# Patient Record
Sex: FEMALE | Race: WHITE | NOT HISPANIC OR LATINO | Employment: OTHER | URBAN - NONMETROPOLITAN AREA
[De-identification: names, ages, dates, MRNs, and addresses within clinical notes are randomized per-mention and may not be internally consistent; named-entity substitution may affect disease eponyms.]

---

## 2018-10-05 ENCOUNTER — NURSE TRIAGE (OUTPATIENT)
Dept: CALL CENTER | Facility: HOSPITAL | Age: 28
End: 2018-10-05

## 2018-10-05 VITALS — WEIGHT: 6.38 LBS

## 2018-10-05 NOTE — TELEPHONE ENCOUNTER
"Caller is calling because of a diaper rash, stated buttocks is red, bleeding and the child is crying with bowel movements, child is breast feed. Will be going to see his provider, stated she can walk in    Reason for Disposition  • [1]  (< 1 month old ) AND [2] infection suspected (open sores, yellow crusts)    Additional Information  • Negative: [1] Red tender ring around the anus AND [2] no associated diaper rash  • Negative: Boil suspected (painful red lump that's marble size or larger)  • Negative: Doesn't fit the description of diaper rash  • Negative: [1] Age < 12 weeks AND [2] fever 100.4 F (38.0 C) or higher rectally  • Negative: [1] Reed (< 1 month old) AND [2] starts to look or act abnormal in any way (e.g., decrease in activity or feeding)  • Negative: [1]  (< 1 month old) AND [2] tiny water blisters or pimples (like chickenpox) in a cluster    Answer Assessment - Initial Assessment Questions  1. APPEARANCE OF RASH: \"What does it look like?\"       Res and warn to touch according to parent  2. SIZE: \"How much of the diaper area is involved?\"       Has bottom  3. SEVERITY: \"How bad is the diaper rash?\" \"Does it make your child cry?\"       Severe and child cries  4. ONSET: \"When did the diaper rash start?\"       Notice today  5. TRIGGERS: \"How do you clean off the skin after poops?\"       Used diaper wipes  6. RECURRENT SYMPTOM: \"Has your child had diaper rash before?\" If so, ask: \"What happened last time?\"       no  7. TREATMENT: \"What treatment worked best last time?\"       none8. CAUSE: \"What do you think is causing the diaper rash?\" mother says he is breast feed    Protocols used: DIAPER RASH-PEDIATRIC-      "

## 2020-05-28 ENCOUNTER — LAB REQUISITION (OUTPATIENT)
Dept: LAB | Facility: HOSPITAL | Age: 30
End: 2020-05-28

## 2020-05-28 DIAGNOSIS — Z00.00 ROUTINE GENERAL MEDICAL EXAMINATION AT A HEALTH CARE FACILITY: ICD-10-CM

## 2020-05-28 LAB
BLOODY SPECIMEN?: NO
FIBRONECTIN FETAL VAG QL: NEGATIVE

## 2020-05-28 PROCEDURE — 82731 ASSAY OF FETAL FIBRONECTIN: CPT | Performed by: OBSTETRICS & GYNECOLOGY

## 2021-03-29 ENCOUNTER — TELEPHONE (OUTPATIENT)
Dept: OBSTETRICS AND GYNECOLOGY | Facility: CLINIC | Age: 31
End: 2021-03-29

## 2021-03-29 NOTE — TELEPHONE ENCOUNTER
Purnima Wilkes N.P> called wanting to sched appt with gyn provider here for her daughter Erica who is currently having a lot of pelvic pain. She went to [facility] today & they gave her 800mg Ibuprofen & went her home. 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 Pt was seeing Dr Gilbert for several years. Had Left fallopian tube removed. Still has Left ovary & horn. Then had 2 children. After that they removed her Right tube. Pt still has Right ovary & horn. 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 Transferred to [facility] to sched U/S & appt. Pt's mom notified of appt date & time, 4/1 2:30 U/S 3p appt with Dr Bradford.

## 2022-06-17 ENCOUNTER — TELEPHONE (OUTPATIENT)
Dept: OTOLARYNGOLOGY | Facility: CLINIC | Age: 32
End: 2022-06-17

## 2022-11-07 ENCOUNTER — OFFICE VISIT (OUTPATIENT)
Dept: OTOLARYNGOLOGY | Facility: CLINIC | Age: 32
End: 2022-11-07

## 2022-11-07 ENCOUNTER — LAB (OUTPATIENT)
Dept: LAB | Facility: HOSPITAL | Age: 32
End: 2022-11-07

## 2022-11-07 VITALS
WEIGHT: 175 LBS | BODY MASS INDEX: 23.7 KG/M2 | HEART RATE: 74 BPM | SYSTOLIC BLOOD PRESSURE: 103 MMHG | HEIGHT: 72 IN | DIASTOLIC BLOOD PRESSURE: 65 MMHG | TEMPERATURE: 97.8 F | RESPIRATION RATE: 16 BRPM

## 2022-11-07 DIAGNOSIS — R13.10 DYSPHAGIA, UNSPECIFIED TYPE: ICD-10-CM

## 2022-11-07 DIAGNOSIS — E04.1 THYROID NODULE: ICD-10-CM

## 2022-11-07 DIAGNOSIS — E04.1 THYROID NODULE: Primary | ICD-10-CM

## 2022-11-07 DIAGNOSIS — R09.89 GLOBUS SENSATION: ICD-10-CM

## 2022-11-07 LAB
T4 FREE SERPL-MCNC: 1.12 NG/DL (ref 0.93–1.7)
TSH SERPL DL<=0.05 MIU/L-ACNC: 0.98 UIU/ML (ref 0.27–4.2)

## 2022-11-07 PROCEDURE — 84443 ASSAY THYROID STIM HORMONE: CPT

## 2022-11-07 PROCEDURE — 36415 COLL VENOUS BLD VENIPUNCTURE: CPT

## 2022-11-07 PROCEDURE — 99204 OFFICE O/P NEW MOD 45 MIN: CPT | Performed by: NURSE PRACTITIONER

## 2022-11-07 PROCEDURE — 84439 ASSAY OF FREE THYROXINE: CPT

## 2022-11-07 PROCEDURE — 84432 ASSAY OF THYROGLOBULIN: CPT

## 2022-11-07 PROCEDURE — 86800 THYROGLOBULIN ANTIBODY: CPT

## 2022-11-07 RX ORDER — FAMOTIDINE 40 MG/1
40 TABLET, FILM COATED ORAL DAILY
Qty: 30 TABLET | Refills: 3 | Status: SHIPPED | OUTPATIENT
Start: 2022-11-07 | End: 2022-11-07

## 2022-11-07 RX ORDER — FAMOTIDINE 20 MG/1
20 TABLET, FILM COATED ORAL DAILY
Qty: 30 TABLET | Refills: 3 | Status: SHIPPED | OUTPATIENT
Start: 2022-11-07

## 2022-11-07 NOTE — PROGRESS NOTES
YOB: 1990  Location: [facility]  Location Address: [address] [place] [address], [facility], [address] [place] [address]  Location Phone: [phone]    Chief Complaint   Patient presents with   • thyroid nodule       History of Present Illness  Erica Jiménez is a 31 y.o. female.  Erica Jiménez is here for evaluation of ENT complaints. The patient has had problems with thyroid nodules. The patient has had mild to moderate symptoms. The symptoms have been present for the last several months. There have been no identified factors that aggravate the symptoms. There have been no factors that have improved the symptoms.    She reports globus sensation and dysphagia. She declines hoarseness.     History reviewed. No pertinent past medical history.    Past Surgical History:   Procedure Laterality Date   •  SECTION         Outpatient Medications Marked as Taking for the 22 encounter (Office Visit) with Carlton Finn APRN   Medication Sig Dispense Refill   • Prenatal Vit-Fe Fumarate-FA (PRENATAL VITAMINS PO) Take  by mouth.         Patient has no known allergies.    History reviewed. No pertinent family history.    Social History     Socioeconomic History   • Marital status:    Tobacco Use   • Smoking status: Never   • Smokeless tobacco: Never   Vaping Use   • Vaping Use: Never used   Substance and Sexual Activity   • Alcohol use: Never   • Drug use: Never       Review of Systems   Constitutional: Negative.    HENT: Positive for trouble swallowing.         Admits globus sensation   Eyes: Negative.    Respiratory: Negative.    Cardiovascular: Negative.    Gastrointestinal: Negative.    Endocrine: Negative.    Genitourinary: Negative.    Musculoskeletal: Negative.    Skin: Negative.    Allergic/Immunologic: Negative.    Neurological: Negative.    Hematological: Negative.    Psychiatric/Behavioral: Negative.        Vitals:    22 1515   BP: 103/65   Pulse: 74   Resp: 16   Temp: 97.8 °F (36.6 °C)        Body mass index is 23.73 kg/m².    Objective     Physical Exam  Vitals reviewed.   Constitutional:       Appearance: Normal appearance. She is normal weight.   HENT:      Head: Normocephalic and atraumatic.      Right Ear: Hearing and external ear normal.      Left Ear: Hearing and external ear normal.      Nose: Nose normal.      Mouth/Throat:      Lips: Pink.      Mouth: Mucous membranes are moist.      Pharynx: Oropharynx is clear. Uvula midline.   Neck:      Comments: Palpable right thyroid nodule  Musculoskeletal:      Cervical back: Full passive range of motion without pain.   Neurological:      Mental Status: She is alert.   Psychiatric:         Behavior: Behavior is cooperative.         Assessment & Plan   Diagnoses and all orders for this visit:    1. Thyroid nodule (Primary)  -     US Thyroid  -     TSH; Future  -     T4, Free; Future  -     Thyroglobulin With Anti-TG; Future  -     US Thyroid; Future    2. Dysphagia, unspecified type    3. Globus sensation    Other orders  -     Discontinue: famotidine (Pepcid) 40 MG tablet; Take 1 tablet by mouth Daily.  Dispense: 30 tablet; Refill: 3  -     famotidine (Pepcid) 20 MG tablet; Take 1 tablet by mouth Daily.  Dispense: 30 tablet; Refill: 3      * Surgery not found *  Orders Placed This Encounter   Procedures   • US Thyroid     Order Specific Question:   Reason for Exam:     Answer:   Thyroid disease   • US Thyroid     Standing Status:   Future     Standing Expiration Date:   11/7/2023     Order Specific Question:   Reason for Exam:     Answer:   Thyroid disease   • TSH     Standing Status:   Future     Number of Occurrences:   1     Standing Expiration Date:   11/7/2023     Order Specific Question:   Release to patient     Answer:   Routine Release   • T4, Free     Standing Status:   Future     Number of Occurrences:   1     Standing Expiration Date:   11/7/2023     Order Specific Question:   Release to patient     Answer:   Routine Release   •  Thyroglobulin With Anti-TG     Standing Status:   Future     Number of Occurrences:   1     Standing Expiration Date:   11/7/2023     Order Specific Question:   Release to patient     Answer:   Routine Release     Will obtain TSH, T4, and thyroglobulin  Start 20 mg pepcid before breakfast  Call with any new/worsening problems or concerns    Return in about 2 months (around 1/7/2023) for Recheck.       Patient Instructions   Will obtain TSH, T4, and thyroglobulin  Start 20 mg pepcid before breakfast  Call with any new/worsening problems or concerns    Gastroesophageal Reflux Disease (Laryngopharyngeal Reflux), Adult  Gastroesophageal reflux disease (GERD) and/or Laryngopharyngeal Reflux, (LPR) happens when acid from your stomach flows up into the esophagus and/or throat and voicebox or larynx. When acid comes in contact with the these organs, the acid can cause soreness (inflammation). Over time, GERD may create small holes (ulcers) in the lining of the esophagus and may lead to the development of hoarseness, difficulty swallowing,   feeling of something stuck in the throat, increased mucous or drainage and even predispose to the development of malignancies, (cancer).    CAUSES   · Increased body weight. This puts pressure on the stomach, making acid rise from the stomach into the esophagus.  · Smoking. This increases acid production in the stomach.  · Drinking alcohol. This causes decreased pressure in the lower esophageal sphincter (valve or ring of muscle between the esophagus and stomach), allowing acid from the stomach into the esophagus.  · Late evening meals and a full stomach. This increases pressure and acid production in the stomach.  · A malformed lower esophageal sphincter  · Diet which can include avoidance of gluten and dairy products  · Age  SYMPTOMS   · Burning pain in the lower part of the mid-chest behind the breastbone and in the mid-stomach area. This may occur twice a week or more  often.  · Trouble swallowing.  · Sore throat.  · Dry cough.  · Asthma-like symptoms including chest tightness, shortness of breath, or wheezing.  · Globus sensation-something stuck in the throat/fullness  · Hoarseness  DIAGNOSIS   Your caregiver may be able to diagnose GERD based on your symptoms. In some cases, X-rays and other tests may be done to check for complications or to check the condition of your stomach and esophagus.  You may need to see another doctor.  TREATMENT   Over-the-counter or prescription medicines to help decrease acid production.   Dietary and behavioral modifications or changes may be also recommended.  HOME CARE INSTRUCTIONS   · Change the factors that you can control. Ask your caregiver for guidance concerning weight loss, quitting smoking, and alcohol consumption.  · Avoid foods and drinks that make your symptoms worse, and MAY include such as:  ¨ Caffeine or alcoholic drinks.  ¨ Chocolate.  ¨ Gluten containing foods  ¨ Dairy  ¨ Peppermint or mint flavorings.  ¨ Garlic and onions.  ¨ Spicy foods.  ¨ Citrus fruits, such as oranges, jacy, or limes.  ¨ Tomato-based foods such as sauce, chili, salsa, and pizza.  ¨ Fried and fatty foods.  · Avoid lying down for the 3 hours prior to your bedtime or prior to taking a nap.  · Eat small, frequent meals instead of large meals.  · Wear loose-fitting clothing. Do not wear anything tight around your waist that causes pressure on your stomach.  · Raise the head of your bed 6 to 8 inches with wood blocks to help you sleep. Extra pillows will not help.  · Only take over-the-counter or prescription medicines for pain, discomfort, or fever as directed by your caregiver.  · Do not take aspirin, ibuprofen, or other nonsteroidal anti-inflammatory drugs if possible (NSAIDs).  SEEK IMMEDIATE MEDICAL CARE IF:   · You have pain in your arms, neck, jaw, teeth, or back.  · Your pain increases or changes in intensity or duration.  · You develop nausea, vomiting,  or sweating (diaphoresis).  · You develop shortness of breath, or you faint.  · Your vomit is green, yellow, black, or looks like coffee grounds or blood.  · Your stool is red, bloody, or black.  These symptoms could be signs of other problems, such as heart disease, gastric bleeding, or esophageal bleeding.  MAKE SURE YOU:   · Understand these instructions.  · Will watch your condition.  · Will get help right away if you are not doing well or get worse.     This information is not intended to replace advice given to you by your physician. Make sure you discuss any questions you have with your health care provider.     Modified by Tim Ashraf MD, FACS 2016.  Document Released: 2006 Document Revised: 2016 Document Reviewed: 2016  [employer] Interactive Patient Education © Elsevier Inc.       CONTACT INFORMATION:  The main office phone number is [phone]. For emergencies after hours and on weekends, this number will convert over to our answering service and the on call provider will answer. Please try to keep non emergent phone calls/ questions to office hours 9am-5pm Monday through Friday.      CrowdTangle  As an alternative, you can sign up and use the Epic MyChart system for more direct and quicker access for non emergent questions/ problems.  [employer] allows you to send messages to your doctor, view your test results, renew your prescriptions, schedule appointments, and more. To sign up, go to [employer] and click on the Sign Up Now link in the New User? box. Enter your CrowdTangle Activation Code exactly as it appears below along with the last four digits of your Social Security Number and your Date of Birth () to complete the sign-up process. If you do not sign up before the expiration date, you must request a new code.     CrowdTangle Activation Code: Activation code not generated  Current CrowdTangle Status: Active     If you have questions, you can email  [email or web address] or call [phone] to talk to our MyChart staff. Remember, MyChart is NOT to be used for urgent needs. For medical emergencies, dial 911.     IF YOU SMOKE OR USE TOBACCO PLEASE READ THE FOLLOWING:  Why is smoking bad for me?  Smoking increases the risk of heart disease, lung disease, vascular disease, stroke, and cancer. If you smoke, STOP!        IF YOU SMOKE OR USE TOBACCO PLEASE READ THE FOLLOWING:  Why is smoking bad for me?  Smoking increases the risk of heart disease, lung disease, vascular disease, stroke, and cancer. If you smoke, STOP!     For more information:  Quit Now [place]  1-800-QUIT-NOW  [email or web address]

## 2022-11-07 NOTE — PATIENT INSTRUCTIONS
Will obtain TSH, T4, and thyroglobulin  Start 20 mg pepcid before breakfast  Call with any new/worsening problems or concerns    Gastroesophageal Reflux Disease (Laryngopharyngeal Reflux), Adult  Gastroesophageal reflux disease (GERD) and/or Laryngopharyngeal Reflux, (LPR) happens when acid from your stomach flows up into the esophagus and/or throat and voicebox or larynx. When acid comes in contact with the these organs, the acid can cause soreness (inflammation). Over time, GERD may create small holes (ulcers) in the lining of the esophagus and may lead to the development of hoarseness, difficulty swallowing,   feeling of something stuck in the throat, increased mucous or drainage and even predispose to the development of malignancies, (cancer).    CAUSES   Increased body weight. This puts pressure on the stomach, making acid rise from the stomach into the esophagus.  Smoking. This increases acid production in the stomach.  Drinking alcohol. This causes decreased pressure in the lower esophageal sphincter (valve or ring of muscle between the esophagus and stomach), allowing acid from the stomach into the esophagus.  Late evening meals and a full stomach. This increases pressure and acid production in the stomach.  A malformed lower esophageal sphincter  Diet which can include avoidance of gluten and dairy products  Age  SYMPTOMS   Burning pain in the lower part of the mid-chest behind the breastbone and in the mid-stomach area. This may occur twice a week or more often.  Trouble swallowing.  Sore throat.  Dry cough.  Asthma-like symptoms including chest tightness, shortness of breath, or wheezing.  Globus sensation-something stuck in the throat/fullness  Hoarseness  DIAGNOSIS   Your caregiver may be able to diagnose GERD based on your symptoms. In some cases, X-rays and other tests may be done to check for complications or to check the condition of your stomach and esophagus.  You may need to see another  doctor.  TREATMENT   Over-the-counter or prescription medicines to help decrease acid production.   Dietary and behavioral modifications or changes may be also recommended.  HOME CARE INSTRUCTIONS   Change the factors that you can control. Ask your caregiver for guidance concerning weight loss, quitting smoking, and alcohol consumption.  Avoid foods and drinks that make your symptoms worse, and MAY include such as:  Caffeine or alcoholic drinks.  Chocolate.  Gluten containing foods  Dairy  Peppermint or mint flavorings.  Garlic and onions.  Spicy foods.  Citrus fruits, such as oranges, jacy, or limes.  Tomato-based foods such as sauce, chili, salsa, and pizza.  Fried and fatty foods.  Avoid lying down for the 3 hours prior to your bedtime or prior to taking a nap.  Eat small, frequent meals instead of large meals.  Wear loose-fitting clothing. Do not wear anything tight around your waist that causes pressure on your stomach.  Raise the head of your bed 6 to 8 inches with wood blocks to help you sleep. Extra pillows will not help.  Only take over-the-counter or prescription medicines for pain, discomfort, or fever as directed by your caregiver.  Do not take aspirin, ibuprofen, or other nonsteroidal anti-inflammatory drugs if possible (NSAIDs).  SEEK IMMEDIATE MEDICAL CARE IF:   You have pain in your arms, neck, jaw, teeth, or back.  Your pain increases or changes in intensity or duration.  You develop nausea, vomiting, or sweating (diaphoresis).  You develop shortness of breath, or you faint.  Your vomit is green, yellow, black, or looks like coffee grounds or blood.  Your stool is red, bloody, or black.  These symptoms could be signs of other problems, such as heart disease, gastric bleeding, or esophageal bleeding.  MAKE SURE YOU:   Understand these instructions.  Will watch your condition.  Will get help right away if you are not doing well or get worse.     This information is not intended to replace advice  given to you by your physician. Make sure you discuss any questions you have with your health care provider.     Modified by Tim Ashraf MD, FACS 2016.  Document Released: 2006 Document Revised: 2016 Document Reviewed: 2016  [employer] Interactive Patient Education  [employer]vier Inc.       CONTACT INFORMATION:  The main office phone number is [phone]. For emergencies after hours and on weekends, this number will convert over to our answering service and the on call provider will answer. Please try to keep non emergent phone calls/ questions to office hours 9am-5pm Monday through Friday.      Trover  As an alternative, you can sign up and use the Epic MyChart system for more direct and quicker access for non emergent questions/ problems.  [employer] allows you to send messages to your doctor, view your test results, renew your prescriptions, schedule appointments, and more. To sign up, go to [employer] and click on the Sign Up Now link in the New User? box. Enter your [employer] Activation Code exactly as it appears below along with the last four digits of your Social Security Number and your Date of Birth () to complete the sign-up process. If you do not sign up before the expiration date, you must request a new code.     T[employer]ver Activation Code: Activation code not generated  Current Trover Status: Active     If you have questions, you can email [email or web address] or call [phone] to talk to our Trover staff. Remember, Trover is NOT to be used for urgent needs. For medical emergencies, dial 911.     IF YOU SMOKE OR USE TOBACCO PLEASE READ THE FOLLOWING:  Why is smoking bad for me?  Smoking increases the risk of heart disease, lung disease, vascular disease, stroke, and cancer. If you smoke, STOP!        IF YOU SMOKE OR USE TOBACCO PLEASE READ THE FOLLOWING:  Why is smoking bad for me?  Smoking increases the risk of heart disease, lung  disease, vascular disease, stroke, and cancer. If you smoke, STOP!     For more information:  Quit Now [place]  1-800-QUIT-NOW  [email or web address]

## 2022-11-08 ENCOUNTER — TELEPHONE (OUTPATIENT)
Dept: OTOLARYNGOLOGY | Facility: CLINIC | Age: 32
End: 2022-11-08

## 2022-11-08 DIAGNOSIS — E04.1 THYROID NODULE: Primary | ICD-10-CM

## 2022-11-08 NOTE — TELEPHONE ENCOUNTER
----- Message from NGOC Toro sent at 11/8/2022  7:55 AM CST -----  Please let patient know that her thyroid nodule does meet criteria for a biopsy. She will be contacted to set this up.

## 2022-11-09 LAB
THYROGLOB AB SERPL-ACNC: <1 IU/ML (ref 0–0.9)
THYROGLOB SERPL-MCNC: 9.3 NG/ML (ref 1.5–38.5)

## 2022-11-16 ENCOUNTER — HOSPITAL ENCOUNTER (OUTPATIENT)
Dept: ULTRASOUND IMAGING | Facility: HOSPITAL | Age: 32
Discharge: HOME | End: 2022-11-16
Admitting: NURSE PRACTITIONER

## 2022-11-16 DIAGNOSIS — E04.1 THYROID NODULE: ICD-10-CM

## 2022-11-16 PROCEDURE — 76942 ECHO GUIDE FOR BIOPSY: CPT

## 2022-11-16 PROCEDURE — 88112 CYTOPATH CELL ENHANCE TECH: CPT | Performed by: NURSE PRACTITIONER

## 2022-11-16 PROCEDURE — 88172 CYTP DX EVAL FNA 1ST EA SITE: CPT | Performed by: NURSE PRACTITIONER

## 2022-11-16 RX ORDER — LIDOCAINE HYDROCHLORIDE 10 MG/ML
10 INJECTION, SOLUTION INFILTRATION; PERINEURAL ONCE
Status: DISPENSED | OUTPATIENT
Start: 2022-11-16

## 2022-11-16 RX ORDER — LIDOCAINE HYDROCHLORIDE 10 MG/ML
5 INJECTION, SOLUTION INFILTRATION; PERINEURAL ONCE
Status: DISPENSED | OUTPATIENT
Start: 2022-11-16

## 2022-12-02 ENCOUNTER — TELEPHONE (OUTPATIENT)
Dept: OTOLARYNGOLOGY | Facility: CLINIC | Age: 32
End: 2022-12-02

## 2022-12-02 LAB
BEAKER LAB AP INTRAOPERATIVE CONSULTATION: NORMAL
CYTO UR: NORMAL
LAB AP CASE REPORT: NORMAL
Lab: NORMAL
PATH REPORT.FINAL DX SPEC: NORMAL
PATH REPORT.GROSS SPEC: NORMAL

## 2022-12-02 NOTE — TELEPHONE ENCOUNTER
----- Message from NGOC Toro sent at 12/2/2022  9:11 AM CST -----  FNA results, we will need to see her to discuss options

## 2023-01-09 DIAGNOSIS — C73 THYROID CARCINOMA (HCC): Primary | ICD-10-CM

## 2023-01-09 NOTE — PROGRESS NOTES
MEDICAL ONCOLOGY CONSULTATION    Pt Name: Lanette Ram  MRN: [id number]  YOB: 1990  Date of evaluation: 1/10/2023    REASON FOR CONSULTATION:  Papillary thyroid cancer  REQUESTING PHYSICIAN: Dr Alberto Dorado    History Obtained From:  patient and old medical records    HISTORY OF PRESENT ILLNESS:    Diagnosis  Papillary thyroid carcinoma, Dec 2022  BRAF V600E: POSITIVE  [id number]    Treatment Summary  12/14/22 Total thyroidectomy by Dr Nay Alejandra at [address] was first seen by me on 1/10/2023. She was referred by ENT for a diagnosis of papillary thyroid carcinoma. 1/7/22 US thyroid [facility]): Mixed cystic and solid lesion involving the midpole of the right lobe measuring up to 1.5 cm in size. It does demonstrate lobular contours with some margin somewhat ill-defined, particularly along the anterior margin of the lesion. This is borderline in size for fine-needle aspiration based on ACR criteria and is a TR 4 lesion. Given that it is a solitary lesion I would suggest FNA of the solid component of this lesion to assure benignity. 11/7/22 [facility] labs: Thyroglobulin 9.3, Thyroglobulin ab <1.0, T4 1.12, TSH 0.978  11/16/22 Thyroid, right lobe, fine-needle aspiration([facility]): [place] category III: Follicular lesion of undetermined significance  12/14/22 Total thyroidectomy by Dr Nay Alejandra at [facility] with sampling of 1 lymph node. 12/14/22 Thyroid, total thyroidectomy: Papillary thyroid carcinoma within the isthmus and lower right lobe. All margins are clear. Four parathyroid glands identified. Benign lymph  node (0/1). Tumor focality: unifocal. Tumor site: right lobe and isthmus. Tumor size: 1cm x 1cm x 0.8cm. Histologic type: papillary carcinoma, classic. Mitotic rate: 0 mitoses/2mm2. Tumor necrosis: not identified. Angioinvasion: not identified. Lymphatic invasion: not identified.  Perineural invasion: not identified. Extrathyroidal extension: not identified. Margins: All margins negative for carcinoma (1mm). Regional lymph node status: all regional lymph node negative. Number of lymph nodes examined: 1. Pathologic stage (pTNM, AJCC 8th edition): pT1a, pN0a. BRAF V600E: POSITIVE  22 [facility]: TSH 1.49  1/10/2022-she was first seen by me. She has been referred to [facility]. She is going to be seen by endocrinology. 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 Past Surgical History:    Past Surgical History:   Procedure Laterality Date     SECTION      X 2    THYROIDECTOMY  12/15/2022       Social History:    Marital status:   Smoking status:Currently; 2 cigarettes daily; 6 years  ETOH status:No  Resides: [place]    Family History:   Family History   Problem Relation Age of Onset    Cancer Mother         Ovarian    Breast Cancer Mother        Current Hospital Medications:    Current Outpatient Medications   Medication Sig Dispense Refill    levothyroxine (SYNTHROID) 100 MCG tablet TAKE 1 TABLET BY MOUTH EVERY MORNING BEFORE BREAKFAST      Calcium Carb-Cholecalciferol (OYSTER SHELL CALCIUM W/D) 500-5 MG-MCG TABS tablet TAKE 1 TABLET BY MOUTH 3 TIMES A DAY FOR 2WEEKS       No current facility-administered medications for this visit.        Allergies: No Known Allergies      Subjective   REVIEW OF SYSTEMS:   CONSTITUTIONAL: no fever, no night sweats, no fatigue;  HEENT: no blurring of vision, no double vision, no hearing difficulty, no tinnitus, no ulceration, no dysplasia, no epistaxis;  LUNGS: no cough, no hemoptysis, no wheeze,  no shortness of breath;  CARDIOVASCULAR: no palpitation, no chest pain, no shortness of breath;  GI: no abdominal pain, no nausea, no vomiting, no diarrhea, no constipation;  FORREST: no dysuria, no hematuria, no frequency or urgency, no nephrolithiasis;  MUSCULOSKELETAL: no joint pain, no swelling, no stiffness;  ENDOCRINE: no polyuria, no polydipsia, no cold or heat intolerance;  HEMATOLOGY: no easy bruising or bleeding, no history of clotting disorder;  DERMATOLOGY: no skin rash, no eczema, no pruritus;  PSYCHIATRY: no depression, no anxiety, no panic attacks, no suicidal ideation, no homicidal ideation;  NEUROLOGY: no syncope, no seizures, no numbness or tingling of hands, no numbness or tingling of feet, no paresis;    Objective   /78   Pulse 97   Temp 98.8 °F (37.1 °C)   Ht 6' (1.829 m)   Wt 176 lb 9.6 oz (80.1 kg)   SpO2 98%   BMI 23.95 kg/m²     PHYSICAL EXAM:  CONSTITUTIONAL: Alert, appropriate, no acute distress  EYES: Non icteric, EOM intact, pupils equal round   ENT: Mucus membranes moist, no oral pharyngeal lesions, external inspection of ears and nose are normal  NECK: Supple, no masses. No palpable thyroid mass  CHEST/LUNGS: CTA bilaterally, normal respiratory effort   CARDIOVASCULAR: RRR, no murmurs. No lower extremity edema  ABDOMEN: soft non-tender, active bowel sounds, no HSM. No palpable masses  EXTREMITIES: warm, full ROM in all 4 extremities, no focal weakness. SKIN: warm, dry with no rashes or lesions  LYMPH: No cervical, clavicular, axillary, or inguinal lymphadenopathy  NEUROLOGIC: follows commands, non focal   PSYCH: mood and affect appropriate. Alert and oriented to time, place, person      LABORATORY RESULTS REVIEWED/ANALYZED BY ME:  1/10/23 CBC  WBC 6.13  HGB 13    Neut 3.53    RADIOLOGY STUDIES REVIEWED BY ME:  As above      ASSESSMENT:    No orders of the defined types were placed in this encounter. John Camejo was seen today for new patient. Diagnoses and all orders for this visit:    Thyroid carcinoma [facility])    Care plan discussed with patient     Papillary thyroid carcinoma, Dec 2022 [id number]  -BRAF V600E: POSITIVE  -12/14/22 Total thyroidectomy by Dr Hi Bach at [facility] with sampling of 1 lymph node. Tumor was 1 cm. Negative LVI. Negative high risk features. Discussed NCCN guidelines briefly. The patient will be seen by endocrinology at [facility]. I will defer further recommendations to [facility]. She has been started on levothyroxine 100 mcg daily. Last TSH was 1.49 at [facility]. Again, this will be followed by endocrinology at [facility]. She will continue to be followed by [facility] at this point and we will defer further recommendations to [facility]. The patient has a family history of thyroid cancer. She will also ask if she is a candidate for a genetic test.  Of note, her tumor had a BRAF V600 E mutation. This is likely a somatic mutation. PLAN:  RTC with MD PRN if symptoms worsens  Proceed with Endocrinologist at [facility] on 1/27/23 for further treatment recommendations. He has      Paresh ARMSTRONG am pre-charting as a registered nurse for Ismael Rust MD. Electronically signed by Paresh Akins RN on 1/10/2023 at 2:46 PM CST. Servando Celis am scribing as Medical Assistant for Ismael Rust MD. Electronically signed by Nasir Gupta MA on 1/10/2023 at 2:16 PM CST. I, Dr Kb Bedolla, personally performed the services described in this documentation as scribed by Nasir Gupta MA in my presence and is both accurate and complete. I have seen, examined and reviewed this patient medication list, appropriate labs and imaging studies. I reviewed relevant medical records and others physicians notes. I discussed the plans of care with the patient. I answered all the questions to the patients satisfaction. I have also reviewed the chief complaint (CC) and part of the history (History of Present Illness (HPI), Past Family Social History [facility]), or Review of Systems (ROS) and made changes when appropriated.        (Please note that portions of this note were completed with a voice recognition program. Efforts were made to edit the dictations but occasionally words are mis-transcribed. )Electronically signed by Tan Lane MD on 1/10/2023 at 2:16 PM      The total time,  I spent to see the patient today includes at least one or more of the following: preparing to see the patient by reviewing prior tests, prior notes or other relevant information, performing appropriate independent examination and evaluation, counseling,, documenting clinic information in the electronic medical record or other health records, independently interpreting results of tests, managing test results and communicating the results to the patient/family or caregiver.

## 2023-01-10 ENCOUNTER — HOSPITAL ENCOUNTER (OUTPATIENT)
Dept: INFUSION THERAPY | Age: 33
Discharge: HOME | End: 2023-01-10
Payer: COMMERCIAL

## 2023-01-10 ENCOUNTER — OFFICE VISIT (OUTPATIENT)
Dept: HEMATOLOGY | Age: 33
End: 2023-01-10
Payer: COMMERCIAL

## 2023-01-10 VITALS
OXYGEN SATURATION: 98 % | TEMPERATURE: 98.8 F | DIASTOLIC BLOOD PRESSURE: 78 MMHG | BODY MASS INDEX: 23.92 KG/M2 | HEART RATE: 97 BPM | WEIGHT: 176.6 LBS | HEIGHT: 72 IN | SYSTOLIC BLOOD PRESSURE: 120 MMHG

## 2023-01-10 DIAGNOSIS — Z71.89 CARE PLAN DISCUSSED WITH PATIENT: ICD-10-CM

## 2023-01-10 DIAGNOSIS — C73 THYROID CARCINOMA (HCC): Primary | ICD-10-CM

## 2023-01-10 DIAGNOSIS — C73 THYROID CARCINOMA (HCC): ICD-10-CM

## 2023-01-10 LAB
BASOPHILS ABSOLUTE: 0.04 K/UL (ref 0.01–0.08)
BASOPHILS RELATIVE PERCENT: 0.7 % (ref 0.1–1.2)
EOSINOPHILS ABSOLUTE: 0.27 K/UL (ref 0.04–0.54)
EOSINOPHILS RELATIVE PERCENT: 4.4 % (ref 0.7–7)
HCT VFR BLD CALC: 40 % (ref 34.1–44.9)
HEMOGLOBIN: 13 G/DL (ref 11.2–15.7)
LYMPHOCYTES ABSOLUTE: 1.79 K/UL (ref 1.18–3.74)
LYMPHOCYTES RELATIVE PERCENT: 29.2 % (ref 19.3–53.1)
MCH RBC QN AUTO: 30.4 PG (ref 25.6–32.2)
MCHC RBC AUTO-ENTMCNC: 32.5 G/DL (ref 32.3–35.5)
MCV RBC AUTO: 93.5 FL (ref 79.4–94.8)
MONOCYTES ABSOLUTE: 0.49 K/UL (ref 0.24–0.82)
MONOCYTES RELATIVE PERCENT: 8 % (ref 4.7–12.5)
NEUTROPHILS ABSOLUTE: 3.53 K/UL (ref 1.56–6.13)
NEUTROPHILS RELATIVE PERCENT: 57.5 % (ref 34–71.1)
PDW BLD-RTO: 12.5 % (ref 11.7–14.4)
PLATELET # BLD: 130 K/UL (ref 182–369)
PMV BLD AUTO: 12.6 FL (ref 7.4–10.4)
RBC # BLD: 4.28 M/UL (ref 3.93–5.22)
WBC # BLD: 6.13 K/UL (ref 3.98–10.04)

## 2023-01-10 PROCEDURE — 85025 COMPLETE CBC W/AUTO DIFF WBC: CPT

## 2023-01-10 PROCEDURE — 99202 OFFICE O/P NEW SF 15 MIN: CPT

## 2023-01-10 PROCEDURE — 36415 COLL VENOUS BLD VENIPUNCTURE: CPT

## 2023-01-10 PROCEDURE — 99203 OFFICE O/P NEW LOW 30 MIN: CPT | Performed by: INTERNAL MEDICINE

## 2023-01-10 RX ORDER — LEVOTHYROXINE SODIUM 0.1 MG/1
TABLET ORAL
COMMUNITY
Start: 2022-12-15

## 2023-01-10 ASSESSMENT — PROMIS GLOBAL HEALTH SCALE
IN THE PAST 7 DAYS, HOW WOULD YOU RATE YOUR PAIN ON AVERAGE [ON A SCALE FROM 0 (NO PAIN) TO 10 (WORST IMAGINABLE PAIN)]?: 0
IN GENERAL, HOW WOULD YOU RATE YOUR MENTAL HEALTH, INCLUDING YOUR MOOD AND YOUR ABILITY TO THINK [ON A SCALE OF 1 (POOR) TO 5 (EXCELLENT)]?: 5
IN GENERAL, WOULD YOU SAY YOUR QUALITY OF LIFE IS...[ON A SCALE OF 1 (POOR) TO 5 (EXCELLENT)]: 5
IN GENERAL, WOULD YOU SAY YOUR HEALTH IS...[ON A SCALE OF 1 (POOR) TO 5 (EXCELLENT)]: 4
IN GENERAL, HOW WOULD YOU RATE YOUR SATISFACTION WITH YOUR SOCIAL ACTIVITIES AND RELATIONSHIPS [ON A SCALE OF 1 (POOR) TO 5 (EXCELLENT)]?: 5
SUM OF RESPONSES TO QUESTIONS 3, 6, 7, & 8: 13
IN GENERAL, PLEASE RATE HOW WELL YOU CARRY OUT YOUR USUAL SOCIAL ACTIVITIES (INCLUDES ACTIVITIES AT HOME, AT WORK, AND IN YOUR COMMUNITY, AND RESPONSIBILITIES AS A PARENT, CHILD, SPOUSE, EMPLOYEE, FRIEND, ETC) [ON A SCALE OF 1 (POOR) TO 5 (EXCELLENT)]?: 5
IN GENERAL, HOW WOULD YOU RATE YOUR PHYSICAL HEALTH [ON A SCALE OF 1 (POOR) TO 5 (EXCELLENT)]?: 5
IN THE PAST 7 DAYS, HOW OFTEN HAVE YOU BEEN BOTHERED BY EMOTIONAL PROBLEMS, SUCH AS FEELING ANXIOUS, DEPRESSED, OR IRRITABLE [ON A SCALE FROM 1 (NEVER) TO 5 (ALWAYS)]?: 2
IN THE PAST 7 DAYS, HOW WOULD YOU RATE YOUR FATIGUE ON AVERAGE [ON A SCALE FROM 1 (NONE) TO 5 (VERY SEVERE)]?: 3
TO WHAT EXTENT ARE YOU ABLE TO CARRY OUT YOUR EVERYDAY PHYSICAL ACTIVITIES SUCH AS WALKING, CLIMBING STAIRS, CARRYING GROCERIES, OR MOVING A CHAIR [ON A SCALE OF 1 (NOT AT ALL) TO 5 (COMPLETELY)]?: 5
SUM OF RESPONSES TO QUESTIONS 2, 4, 5, & 10: 17

## 2023-03-20 ENCOUNTER — TELEPHONE (OUTPATIENT)
Dept: OTOLARYNGOLOGY | Facility: CLINIC | Age: 33
End: 2023-03-20
Payer: COMMERCIAL

## 2023-03-20 NOTE — TELEPHONE ENCOUNTER
Patient advised to see endocrinology and Dr. Ellsworth for workup. She is not a surgical candidate and had total thyroid by Dr. Rodriguez at [facility].

## 2023-03-20 NOTE — TELEPHONE ENCOUNTER
Caller: VIVIEN SHANTANU     Relationship: SELF     Best call back number: [phone]    PT IS A CURRENT PT, HAS SEEN [facility] [place] IN PAST. PT WANTS TO SEE DR. SANTA. PT EXPLAINS THE REASON BEING PT HAD A THYROIDECTOMY DONE BY A DR. YING AT [facility] December 14TH 2022 AND HE REMOVED ALL OF PT'S PARATHYROID GLANDS UNKNOWINGLY.     PT WENT TO AN ENT IN [place], [place] (DR. TAVAREZ) & FOUND THERE IS STILL SOME PARATHYROID ACTIVITY AND IS WANTING TO FIND OUT WHERE THE PARATHYROID GLAND IS SPECIFICALLY BECAUSE THE SURGICAL SITE IS PRESSING AGAINST HER ESOPHAGUS. WANTS TO KNOW IF DR. SANTA CAN SEE HER FOR THIS.       PT SAYS THE ENT PROVIDER SHE SAW (DR. TAVAREZ - [facility]) TOLD HER TO FOLLOW UP WITH THE ORIGINAL SURGEON THAT DID THE THYROIDECTOMY (DR. YING, GENERAL SURGEON) PT SAID SHE DOES NOT WANT TO SEE HIM AND WOULD RATHER DR. SANTA TREAT HER FOR THIS ISSUE.

## 2023-05-08 ENCOUNTER — OFFICE VISIT (OUTPATIENT)
Dept: OTOLARYNGOLOGY | Facility: CLINIC | Age: 33
End: 2023-05-08
Payer: COMMERCIAL

## 2023-05-08 VITALS
SYSTOLIC BLOOD PRESSURE: 116 MMHG | HEART RATE: 86 BPM | RESPIRATION RATE: 16 BRPM | DIASTOLIC BLOOD PRESSURE: 77 MMHG | BODY MASS INDEX: 23.24 KG/M2 | WEIGHT: 171.6 LBS | TEMPERATURE: 98.4 F | HEIGHT: 72 IN

## 2023-05-08 DIAGNOSIS — R13.10 DYSPHAGIA, UNSPECIFIED TYPE: ICD-10-CM

## 2023-05-08 DIAGNOSIS — C73 PAPILLARY THYROID CARCINOMA: ICD-10-CM

## 2023-05-08 DIAGNOSIS — E04.1 THYROID NODULE: Primary | ICD-10-CM

## 2023-05-08 DIAGNOSIS — R09.89 GLOBUS SENSATION: ICD-10-CM

## 2023-05-08 PROCEDURE — 99213 OFFICE O/P EST LOW 20 MIN: CPT | Performed by: NURSE PRACTITIONER

## 2023-05-08 RX ORDER — LEVOTHYROXINE SODIUM 0.12 MG/1
TABLET ORAL
COMMUNITY
Start: 2023-04-29

## 2023-05-08 RX ORDER — OYSTER SHELL CALCIUM WITH VITAMIN D 500; 200 MG/1; [IU]/1
TABLET, FILM COATED ORAL
COMMUNITY
Start: 2023-02-08

## 2023-05-08 NOTE — PATIENT INSTRUCTIONS
Gastroesophageal Reflux Disease (Laryngopharyngeal Reflux), Adult  Gastroesophageal reflux disease (GERD) and/or Laryngopharyngeal Reflux, (LPR) happens when acid from your stomach flows up into the esophagus and/or throat and voicebox or larynx. When acid comes in contact with the these organs, the acid can cause soreness (inflammation). Over time, GERD may create small holes (ulcers) in the lining of the esophagus and may lead to the development of hoarseness, difficulty swallowing,   feeling of something stuck in the throat, increased mucous or drainage and even predispose to the development of malignancies, (cancer).    CAUSES   Increased body weight. This puts pressure on the stomach, making acid rise from the stomach into the esophagus.  Smoking. This increases acid production in the stomach.  Drinking alcohol. This causes decreased pressure in the lower esophageal sphincter (valve or ring of muscle between the esophagus and stomach), allowing acid from the stomach into the esophagus.  Late evening meals and a full stomach. This increases pressure and acid production in the stomach.  A malformed lower esophageal sphincter  Diet which can include avoidance of gluten and dairy products  Age  SYMPTOMS   Burning pain in the lower part of the mid-chest behind the breastbone and in the mid-stomach area. This may occur twice a week or more often.  Trouble swallowing.  Sore throat.  Dry cough.  Asthma-like symptoms including chest tightness, shortness of breath, or wheezing.  Globus sensation-something stuck in the throat/fullness  Hoarseness  DIAGNOSIS   Your caregiver may be able to diagnose GERD based on your symptoms. In some cases, X-rays and other tests may be done to check for complications or to check the condition of your stomach and esophagus.  You may need to see another doctor.  TREATMENT   Over-the-counter or prescription medicines to help decrease acid production.   Dietary and behavioral modifications  or changes may be also recommended.  HOME CARE INSTRUCTIONS   Change the factors that you can control. Ask your caregiver for guidance concerning weight loss, quitting smoking, and alcohol consumption.  Avoid foods and drinks that make your symptoms worse, and MAY include such as:  Caffeine or alcoholic drinks.  Chocolate.  Gluten containing foods  Dairy  Peppermint or mint flavorings.  Garlic and onions.  Spicy foods.  Citrus fruits, such as oranges, jacy, or limes.  Tomato-based foods such as sauce, chili, salsa, and pizza.  Fried and fatty foods.  Avoid lying down for the 3 hours prior to your bedtime or prior to taking a nap.  Eat small, frequent meals instead of large meals.  Wear loose-fitting clothing. Do not wear anything tight around your waist that causes pressure on your stomach.  Raise the head of your bed 6 to 8 inches with wood blocks to help you sleep. Extra pillows will not help.  Only take over-the-counter or prescription medicines for pain, discomfort, or fever as directed by your caregiver.  Do not take aspirin, ibuprofen, or other nonsteroidal anti-inflammatory drugs if possible (NSAIDs).  SEEK IMMEDIATE MEDICAL CARE IF:   You have pain in your arms, neck, jaw, teeth, or back.  Your pain increases or changes in intensity or duration.  You develop nausea, vomiting, or sweating (diaphoresis).  You develop shortness of breath, or you faint.  Your vomit is green, yellow, black, or looks like coffee grounds or blood.  Your stool is red, bloody, or black.  These symptoms could be signs of other problems, such as heart disease, gastric bleeding, or esophageal bleeding.  MAKE SURE YOU:   Understand these instructions.  Will watch your condition.  Will get help right away if you are not doing well or get worse.     This information is not intended to replace advice given to you by your physician. Make sure you discuss any questions you have with your health care provider.     Modified by Tim Ashraf,  MD, FACS 9/8/2016.  Document Released: 09/27/2006 Document Revised: 01/08/2016 Document Reviewed: 04/13/2016  [employer] Interactive Patient Education ©2016 [employer]

## 2023-05-08 NOTE — PROGRESS NOTES
YOB: 1990  Location: [facility]  Location Address: [address] [place] [address], [facility], [address] [place] [address]  Location Phone: [phone]    Chief Complaint   Patient presents with   • Thyroid Problem       History of Present Illness  Erica Jiménez is a 32 y.o. female.  Erica Jiménez is here for follow up of ENT complaints. The patient his s/p total thyroidectomy by Dr. Trujillo on 2022. Pathology resulted as papillary thyroid carcinoma. She is being followed by endocrine at [place] (Dr. Mejia). She was also seen by Dr. Munoz. She has not been evaluated by radiation oncology.   She is currently taking 125 mcg Synthroid. Her last TSH was 5.733 on 2023.  Today she reports dysphagia and pain with swallowing.    History reviewed. No pertinent past medical history.    Past Surgical History:   Procedure Laterality Date   •  SECTION     • THYROIDECTOMY         Outpatient Medications Marked as Taking for the 23 encounter (Office Visit) with Carlton Finn APRN   Medication Sig Dispense Refill   • levothyroxine (SYNTHROID, LEVOTHROID) 125 MCG tablet      • Oyster Shell Calcium/Vit D 500-5 MG-MCG tablet per tablet TAKE 1 TABLET BY MOUTH 3 TIMES A DAY BEFORE MEALS     • Prenatal Vit-Fe Fumarate-FA (PRENATAL VITAMINS PO) Take  by mouth.       Current Facility-Administered Medications for the 23 encounter (Office Visit) with Carlton Finn APRN   Medication Dose Route Frequency Provider Last Rate Last Admin   • lidocaine (XYLOCAINE) 1 % injection 10 mL  10 mL Subcutaneous Once Corey Rosales MD       • lidocaine (XYLOCAINE) 1 % injection 5 mL  5 mL Infiltration Once Carlton Finn APRN       • sodium bicarbonate injection 0.5 mEq  1 mL Injection Once Carlton Finn APRN           Patient has no known allergies.    History reviewed. No pertinent family history.    Social History     Socioeconomic History   • Marital status:    Tobacco Use   • Smoking status: Never   •  Smokeless tobacco: Never   Vaping Use   • Vaping Use: Never used   Substance and Sexual Activity   • Alcohol use: Never   • Drug use: Never       Review of Systems   Constitutional: Negative.    HENT: Positive for trouble swallowing.    Respiratory: Negative.    Cardiovascular: Negative.    Gastrointestinal: Negative.    Neurological: Negative.        Vitals:    05/08/23 1422   BP: 116/77   Pulse: 86   Resp: 16   Temp: 98.4 °F (36.9 °C)       Body mass index is 23.27 kg/m².    Objective     Physical Exam  Vitals reviewed.   Constitutional:       Appearance: Normal appearance. She is normal weight.   HENT:      Head: Normocephalic and atraumatic.      Right Ear: Hearing and external ear normal.      Left Ear: Hearing and external ear normal.      Nose: Nose normal.      Mouth/Throat:      Lips: Pink.      Mouth: Mucous membranes are moist.   Neck:     Musculoskeletal:      Cervical back: Full passive range of motion without pain.   Neurological:      Mental Status: She is alert.   Psychiatric:         Behavior: Behavior is cooperative.         Assessment & Plan   Diagnoses and all orders for this visit:    1. Thyroid nodule (Primary)  -     TSH; Future    2. Dysphagia, unspecified type  -     TSH; Future    3. Globus sensation  -     TSH; Future    4. Papillary thyroid carcinoma  -     Ambulatory Referral to Radiation Oncology      * Surgery not found *  Orders Placed This Encounter   Procedures   • TSH     Standing Status:   Future     Standing Expiration Date:   5/8/2024     Order Specific Question:   Release to patient     Answer:   Routine Release   • Ambulatory Referral to Radiation Oncology     Referral Priority:   Routine     Referral Type:   Consultation     Referral Reason:   Specialty Services Required     Requested Specialty:   Radiation Oncology     Number of Visits Requested:   1     TSH to be obtained  Continue follow up with endocrine  Referral to Dr. Olivo  Reflux precautions  Massages to incision  site  Return for problems    Return in about 2 months (around 7/8/2023) for Recheck.       Patient Instructions   Gastroesophageal Reflux Disease (Laryngopharyngeal Reflux), Adult  Gastroesophageal reflux disease (GERD) and/or Laryngopharyngeal Reflux, (LPR) happens when acid from your stomach flows up into the esophagus and/or throat and voicebox or larynx. When acid comes in contact with the these organs, the acid can cause soreness (inflammation). Over time, GERD may create small holes (ulcers) in the lining of the esophagus and may lead to the development of hoarseness, difficulty swallowing,   feeling of something stuck in the throat, increased mucous or drainage and even predispose to the development of malignancies, (cancer).    CAUSES   · Increased body weight. This puts pressure on the stomach, making acid rise from the stomach into the esophagus.  · Smoking. This increases acid production in the stomach.  · Drinking alcohol. This causes decreased pressure in the lower esophageal sphincter (valve or ring of muscle between the esophagus and stomach), allowing acid from the stomach into the esophagus.  · Late evening meals and a full stomach. This increases pressure and acid production in the stomach.  · A malformed lower esophageal sphincter  · Diet which can include avoidance of gluten and dairy products  · Age  SYMPTOMS   · Burning pain in the lower part of the mid-chest behind the breastbone and in the mid-stomach area. This may occur twice a week or more often.  · Trouble swallowing.  · Sore throat.  · Dry cough.  · Asthma-like symptoms including chest tightness, shortness of breath, or wheezing.  · Globus sensation-something stuck in the throat/fullness  · Hoarseness  DIAGNOSIS   Your caregiver may be able to diagnose GERD based on your symptoms. In some cases, X-rays and other tests may be done to check for complications or to check the condition of your stomach and esophagus.  You may need to see  another doctor.  TREATMENT   Over-the-counter or prescription medicines to help decrease acid production.   Dietary and behavioral modifications or changes may be also recommended.  HOME CARE INSTRUCTIONS   · Change the factors that you can control. Ask your caregiver for guidance concerning weight loss, quitting smoking, and alcohol consumption.  · Avoid foods and drinks that make your symptoms worse, and MAY include such as:  ¨ Caffeine or alcoholic drinks.  ¨ Chocolate.  ¨ Gluten containing foods  ¨ Dairy  ¨ Peppermint or mint flavorings.  ¨ Garlic and onions.  ¨ Spicy foods.  ¨ Citrus fruits, such as oranges, jacy, or limes.  ¨ Tomato-based foods such as sauce, chili, salsa, and pizza.  ¨ Fried and fatty foods.  · Avoid lying down for the 3 hours prior to your bedtime or prior to taking a nap.  · Eat small, frequent meals instead of large meals.  · Wear loose-fitting clothing. Do not wear anything tight around your waist that causes pressure on your stomach.  · Raise the head of your bed 6 to 8 inches with wood blocks to help you sleep. Extra pillows will not help.  · Only take over-the-counter or prescription medicines for pain, discomfort, or fever as directed by your caregiver.  · Do not take aspirin, ibuprofen, or other nonsteroidal anti-inflammatory drugs if possible (NSAIDs).  SEEK IMMEDIATE MEDICAL CARE IF:   · You have pain in your arms, neck, jaw, teeth, or back.  · Your pain increases or changes in intensity or duration.  · You develop nausea, vomiting, or sweating (diaphoresis).  · You develop shortness of breath, or you faint.  · Your vomit is green, yellow, black, or looks like coffee grounds or blood.  · Your stool is red, bloody, or black.  These symptoms could be signs of other problems, such as heart disease, gastric bleeding, or esophageal bleeding.  MAKE SURE YOU:   · Understand these instructions.  · Will watch your condition.  · Will get help right away if you are not doing well or get  worse.     This information is not intended to replace advice given to you by your physician. Make sure you discuss any questions you have with your health care provider.     Modified by Tim Ashraf MD, FACS 9/8/2016.  Document Released: 09/27/2006 Document Revised: 01/08/2016 Document Reviewed: 04/13/2016  [employer] Interactive Patient Education ©2016 [employer]

## 2023-05-16 PROBLEM — C73 PAPILLARY THYROID CARCINOMA: Status: ACTIVE | Noted: 2023-03-24

## 2023-05-26 ENCOUNTER — HOSPITAL ENCOUNTER (OUTPATIENT)
Dept: RADIATION ONCOLOGY | Facility: HOSPITAL | Age: 33
Setting detail: RADIATION/ONCOLOGY SERIES
End: 2023-05-26

## 2023-05-29 PROBLEM — E89.0 S/P TOTAL THYROIDECTOMY: Status: ACTIVE | Noted: 2023-05-29

## 2023-05-30 NOTE — PROGRESS NOTES
[facility]CKamila Olivo MD      Christ Shaw, NGOC  ____________________________________________________________  [facility]  Department of Radiation Oncology  [address] [place] [id number]  Office:  [phone]  Fax: [phone]    DATE:  05/31/2023  PATIENT: Erica Jiménez 1990                         MEDICAL RECORD #:  [id number]    Chief Complaint   Patient presents with    Thyroid Cancer                                                      REASON FOR CONSULTATION:  Erica Jiménez is a 32 y.o.female that has been referred to our office to discuss radiotherapy recommendations for carcinoma of the thyroid. Denies activity change, appetite change, unexpected weight change, nasuea/vomiting, diarrhea, light-headedness, weakness, and headaches. She follows .     History of Present Illness:  11/07/2022 - Appointment with :  Will obtain TSH, T4, and thyroglobulin  Start 20 mg pepcid before breakfast  Call with any new/worsening problems or concerns  Return in about 2 months (around 1/7/2023) for Recheck.    11/07/2022 - Labs:  TSH: 0.978  Free T4: 1.12  Thyroglobulin Ab: <1.0  Thyroglobulin: 9.3    11/07/2022 - Thyroid Ultrasound:  Mixed cystic and solid lesion involving the midpole of the right lobe measuring up to 1.5 cm in size. It does demonstrate lobular contours with some margin somewhat ill-defined, particularly along the anterior margin of the lesion. This is borderline in size for fine-needle aspiration based on ACR criteria and is a TR 4 lesion. Given that it is a solitary lesion I would suggest FNA of the solid component of this lesion to assure benignity.    11/16/2022 - Thyroid, right lobe, fine-needle aspiration:  [place] category III: Follicular lesion of undetermined significance    12/14/2022 - Total Thyroidectomy - Dr. Rodriguez  Papillary thyroid carcinoma within the isthmus and lower right lobe  All margins are clear  Four  parathyroid glands identified  Benign lymph node (0/1)    Tumor focality - unifocal  Tumor site - right lobe and isthmus  Tumor size - 1cm x 1 cm x 0.8 cm  Histologic type - papillary carcinoma, classic (usual, conventional)  Mitotic rate - 0 mitoses/2 mm2  Tumor necrosis - not identified  Angioinvasion - not identified  Lymphatic invasion - not identified  Perineural invasion - not identified  Extrathyroidal extension - not identified  Margin status - all margins negative for carcinoma (1mm)  Regional lymph node status - all regional lymph nodes negative for tumor  Number of lymph nodes examined 1, Level VI  Pathologic stage pT1a, pN0a    [place] REVIEW OF PATH:  THYROID GLAND, TOTAL THYROIDECTOMY:   PAPILLARY CARCINOMA, CLASSICAL TYPE, 1.0 CM, IN THE RIGHT LOBE AND ISTHMUS (SEE SYNOPTIC)   RESECTION MARGINS NEGATIVE   MULTIPLE (4) NORMAL PARATHYROID GLANDS     Synoptic Diagnosis   Referral: [id number], 12/14/2022, x 15 slides:     SPECIMEN   Procedure: Total thyroidectomy   TUMOR   Tumor Focality:  Unifocal   Tumor Characteristics   Tumor Site:  Right lobe   Isthmus   Tumor Size:  Greatest Dimension (Centimeters): 1.0 cm   Histologic Type:  Papillary carcinoma, classic (usual, conventional)   Mitotic Rate:   0 mitoses per 2 mm2   Tumor Necrosis:  Not identified   Angioinvasion (vascular invasion):  Not identified   Lymphatic Invasion:  Not identified   Perineural Invasion:  Not identified   Extrathyroidal Extension:  Not identified   Margin Status:  All margins negative for carcinoma   REGIONAL LYMPH NODES   Regional Lymph Node Status:  Not applicable (no regional lymph nodes submitted or found)   PATHOLOGIC STAGE CLASSIFICATION (pTNM, AJCC 8th Edition)   pT Category:    pT1a   pN Category:  pN not assigned (no nodes submitted or found)   ADDITIONAL FINDINGS   Additional Findings:  Parathyroid gland(s) present   Number of Parathyroid Glands:    3   Parathyroid Gland Findings:    Within normal limits   Block  Selection: The optimal block for possible ancillary studies is: A8     2023 - Labs:  TSH: 5.733  Thyroglobulin: 0.1    2023 - Appointment with Dr. 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 She has had a total thyroidectomy. Based on this she will benefit from Radioactive iodine therapy. Discussed benefits of COYNE therapy in detail and precautions needed to be taken. She will think about it and then let us know next week if she will go ahead with it.  Goals of therapy discussed.  Also discussed about the follow up and surveillance of thyroid cancer including the use of Ultrasounds and thyroglobulin levels.  Will check Thyroglobulin level today as she is more than a month post surgery.    2023 - Thyroid Ultrasound:  No abnormality is seen.    2023 - Appointment with :  TSH to be obtained  Continue follow up with endocrine  Referral to Dr. Olivo  Reflux precautions  Massages to incision site  Return for problems  Return in about 2 months (around 2023) for Recheck.    History obtained from  PATIENT, FAMILY, and CHART    PAST MEDICAL HISTORY  History reviewed. No pertinent past medical history.     PAST SURGICAL HISTORY  Past Surgical History:   Procedure Laterality Date     SECTION      THYROIDECTOMY        FAMILY HISTORY  family history is not on file.     SOCIAL HISTORY  Social History     Tobacco Use    Smoking status: Never    Smokeless tobacco: Never   Vaping Use    Vaping Use: Never used   Substance Use Topics    Alcohol use: Never    Drug use: Never     ALLERGIES  Patient has no known allergies.     MEDICATIONS    Current Outpatient Medications:     levothyroxine (SYNTHROID, LEVOTHROID) 125 MCG tablet, , Disp: , Rfl:     Oyster Shell Calcium/Vit D 500-5 MG-MCG tablet per tablet, TAKE 1 TABLET BY MOUTH 3 TIMES A DAY BEFORE MEALS, Disp: , Rfl:     Prenatal Vit-Fe Fumarate-FA (PRENATAL VITAMINS PO), Take  by mouth., Disp: , Rfl:  "    Current Facility-Administered Medications:     lidocaine (XYLOCAINE) 1 % injection 10 mL, 10 mL, Subcutaneous, Once, Corey Rosales MD    lidocaine (XYLOCAINE) 1 % injection 5 mL, 5 mL, Infiltration, Once, Carlton Finn APRN    sodium bicarbonate injection 0.5 mEq, 1 mL, Injection, Once, Carlton Finn APRN    The following portions of the patient's history were reviewed and updated as appropriate: allergies, current medications, past family history, past medical history, past social history, past surgical history and problem list.    Current outpatient and discharge medications have been reconciled for the patient.  Reviewed by: Wellington Olivo III, MD    REVIEW OF SYSTEMS  Review of Systems   Constitutional: Negative.  Negative for activity change and fatigue.   HENT: Negative.     Respiratory: Negative.  Negative for cough and shortness of breath.    Cardiovascular: Negative.  Negative for chest pain.   Gastrointestinal: Negative.    Genitourinary: Negative.    Musculoskeletal: Negative.    Skin: Negative.    Neurological: Negative.  Negative for dizziness and weakness.   Hematological: Negative.    Psychiatric/Behavioral: Negative.     All other systems reviewed and are negative.    I have reviewed and confirmed the accuracy of the ROS as documented by the MA/LPN/RN Wellington Olivo III, MD    PHYSICAL EXAM  VITAL SIGNS:   Vitals:    05/31/23 1327   BP: 125/73   Weight: 78 kg (172 lb)   Height: 182.9 cm (72\")   PainSc: 0-No pain      Physical Exam  Vitals reviewed.   Constitutional:       Appearance: Normal appearance.   HENT:      Head: Normocephalic.   Cardiovascular:      Rate and Rhythm: Normal rate and regular rhythm.      Pulses: Normal pulses.      Heart sounds: Normal heart sounds.   Pulmonary:      Effort: Pulmonary effort is normal. No respiratory distress.      Breath sounds: Normal breath sounds.   Abdominal:      General: Bowel sounds are normal.   Musculoskeletal:         General: " Normal range of motion.      Cervical back: Normal range of motion and neck supple.   Skin:     General: Skin is warm.      Capillary Refill: Capillary refill takes less than 2 seconds.   Neurological:      General: No focal deficit present.      Mental Status: She is alert and oriented to person, place, and time.   Psychiatric:         Mood and Affect: Mood normal.         Behavior: Behavior normal.         Performance Status: ECOG (0) Fully active, able to carry on all predisease performance without restriction    Clinical Quality Measures  -Pain Documented by Standardized Tool, FPS Erica Jiménez reports a pain score of 0.  Given her pain assessment as noted, treatment options were discussed and the following options were decided upon as a follow-up plan to address the patient's pain:  No pain, no plan given    -Advanced Care Planning Advance Care Planning  ACP discussion was held with the patient during this visit. Patient does not have an advance directive, information provided.    ASSESSMENT AND PLAN  1. Papillary thyroid carcinoma    2. S/P total thyroidectomy    3. Depression screen      Orders Placed This Encounter   Procedures    Thyroglobulin    TSH    Ambulatory Referral to Oncology Social Worker    Ambulatory Referral to Speech Therapy    Ambulatory Referral to Endocrinology     RECOMMENDATIONS: Erica Jiménez was diagnosed with Stage I papillary thyroid carcinoma.     Indications and rationale of radiotherapy treatments according to the NCCN Guidelines for thyroid carcinoma has been discussed with the patient and her family today. We reviewed the risks, benefits and alternatives of therapy. I have seen, examined and reviewed this patient's medication list, appropriate labs and imaging studies.  I reviewed relevant medical records and other physicians notes. I discussed the goals and plans of care with the patient and family and answered all questions.     After review and consideration of the available  diagnostic data and evaluation of the patient, it is my recommendation to obtain labwork, TSH and thyroglobulin today to evaluate for metastatic cells. I-131 WBS scan will be scheduled if necessary at that time. I plan to see the patient back after ordered testing to discuss radiotherapy treatment recommendations.  Questions answered. Patient verbalized understanding.     Continue ongoing management per primary care physician and other specialists. Thank you for allowing me to assist in her care.     Patient Instructions   1) I do not recommend radioactive iodine thyroid treatment  2) recommend TSH and thyroglobulin today  3) Referral to speech therapy for swallowing issues  4) would recommend long-term establishment with endocrinology for thyroid replacement - referral to Dr. Hilliard  5) Return in 6 weeks  Time Spent: I spent 58 minutes caring for Erica on this date of service. This time includes time spent by me in the following activities: preparing for the visit, reviewing tests, obtaining and/or reviewing a separately obtained history, performing a medically appropriate examination and/or evaluation, counseling and educating the patient/family/caregiver, ordering medications, tests, or procedures, referring and communicating with other health care professionals, documenting information in the medical record, independently interpreting results and communicating that information with the patient/family/caregiver, and care coordination.     Wellington Olivo III, MD  05/31/2023

## 2023-05-31 ENCOUNTER — CONSULT (OUTPATIENT)
Dept: RADIATION ONCOLOGY | Facility: HOSPITAL | Age: 33
End: 2023-05-31

## 2023-05-31 ENCOUNTER — LAB (OUTPATIENT)
Dept: LAB | Facility: HOSPITAL | Age: 33
End: 2023-05-31

## 2023-05-31 VITALS
WEIGHT: 172 LBS | HEIGHT: 72 IN | BODY MASS INDEX: 23.3 KG/M2 | DIASTOLIC BLOOD PRESSURE: 73 MMHG | SYSTOLIC BLOOD PRESSURE: 125 MMHG

## 2023-05-31 DIAGNOSIS — E89.0 S/P TOTAL THYROIDECTOMY: ICD-10-CM

## 2023-05-31 DIAGNOSIS — Z13.31 DEPRESSION SCREEN: ICD-10-CM

## 2023-05-31 DIAGNOSIS — C73 PAPILLARY THYROID CARCINOMA: Primary | ICD-10-CM

## 2023-05-31 DIAGNOSIS — C73 PAPILLARY THYROID CARCINOMA: ICD-10-CM

## 2023-05-31 LAB — TSH SERPL DL<=0.05 MIU/L-ACNC: 1.66 UIU/ML (ref 0.27–4.2)

## 2023-05-31 PROCEDURE — G0463 HOSPITAL OUTPT CLINIC VISIT: HCPCS | Performed by: RADIOLOGY

## 2023-05-31 PROCEDURE — 84443 ASSAY THYROID STIM HORMONE: CPT

## 2023-05-31 PROCEDURE — 36415 COLL VENOUS BLD VENIPUNCTURE: CPT

## 2023-05-31 PROCEDURE — 84432 ASSAY OF THYROGLOBULIN: CPT

## 2023-05-31 NOTE — PATIENT INSTRUCTIONS
1) I do not recommend radioactive iodine thyroid treatment  2) recommend TSH and thyroglobulin today  3) Referral to speech therapy for swallowing issues  4) would recommend long-term establishment with endocrinology for thyroid replacement - referral to Dr. Hilliard  5) Return in 6 weeks

## 2023-06-01 ENCOUNTER — TELEPHONE (OUTPATIENT)
Dept: RADIATION ONCOLOGY | Facility: HOSPITAL | Age: 33
End: 2023-06-01

## 2023-06-01 NOTE — TELEPHONE ENCOUNTER
Spoke to Ms. Jiménez via phone due to her depression screening during her radiation consultation for papillary thyroid carcinoma. The radiation oncologist did not recommend radiation treatment at this time. During the consultation the nurse did advise Ms. Jiménez to follow up with her PCP to see if medication for anxiety/depression would be recommended. Ms. Jiménez did confirm to this writer that she planned to follow up with her PCP. [facility] did offer to provide her with information on peer to peer and support groups, but at this time she declined. LOI encouraged her to call if further assistance is needed.

## 2023-06-06 LAB — THYROGLOB SERPL-MCNC: <2 NG/ML

## 2023-06-08 DIAGNOSIS — E89.0 S/P TOTAL THYROIDECTOMY: Primary | ICD-10-CM

## 2023-06-08 DIAGNOSIS — C73 PAPILLARY THYROID CARCINOMA: ICD-10-CM

## 2023-06-19 DIAGNOSIS — E89.0 S/P TOTAL THYROIDECTOMY: ICD-10-CM

## 2023-06-19 DIAGNOSIS — C73 PAPILLARY THYROID CARCINOMA: Primary | ICD-10-CM

## 2023-07-11 PROBLEM — Z87.891 FORMER SMOKER: Status: ACTIVE | Noted: 2023-07-11

## 2023-07-11 PROBLEM — Z78.9 NON-SMOKER: Status: ACTIVE | Noted: 2023-07-11

## 2023-07-24 ENCOUNTER — TELEPHONE (OUTPATIENT)
Dept: PSYCHIATRY | Age: 33
End: 2023-07-24

## 2023-09-24 ENCOUNTER — TRANSCRIBE ORDERS (OUTPATIENT)
Dept: ADMINISTRATIVE | Facility: HOSPITAL | Age: 33
End: 2023-09-24
Payer: COMMERCIAL

## 2023-09-24 DIAGNOSIS — E07.9 DISORDER OF THYROID, UNSPECIFIED: ICD-10-CM

## 2023-09-24 DIAGNOSIS — R13.19 OTHER DYSPHAGIA: ICD-10-CM

## 2023-09-24 DIAGNOSIS — Z85.850 PERSONAL HISTORY OF MALIGNANT NEOPLASM OF THYROID: Primary | ICD-10-CM

## 2023-09-24 DIAGNOSIS — R07.0 PAIN IN THROAT: ICD-10-CM

## 2024-02-13 ENCOUNTER — TRANSCRIBE ORDERS (OUTPATIENT)
Dept: ADMINISTRATIVE | Facility: HOSPITAL | Age: 34
End: 2024-02-13
Payer: COMMERCIAL

## 2024-02-13 DIAGNOSIS — R10.30 LOWER ABDOMINAL PAIN: Primary | ICD-10-CM

## 2024-02-29 ENCOUNTER — HOSPITAL ENCOUNTER (OUTPATIENT)
Dept: CT IMAGING | Facility: HOSPITAL | Age: 34
Discharge: HOME | End: 2024-02-29
Payer: COMMERCIAL